# Patient Record
Sex: FEMALE | Race: AMERICAN INDIAN OR ALASKA NATIVE
[De-identification: names, ages, dates, MRNs, and addresses within clinical notes are randomized per-mention and may not be internally consistent; named-entity substitution may affect disease eponyms.]

---

## 2017-10-11 ENCOUNTER — HOSPITAL ENCOUNTER (OUTPATIENT)
Dept: HOSPITAL 5 - MAMMO | Age: 67
Discharge: HOME | End: 2017-10-11
Attending: PEDIATRICS
Payer: MEDICARE

## 2017-10-11 DIAGNOSIS — Z12.31: Primary | ICD-10-CM

## 2017-10-11 PROCEDURE — 77067 SCR MAMMO BI INCL CAD: CPT

## 2017-10-11 PROCEDURE — G0202 SCR MAMMO BI INCL CAD: HCPCS

## 2017-10-11 NOTE — MAMMOGRAPHY REPORT
BILATERAL MAMMOGRAM:



FINDINGS:

The breast tissue is heterogeneously dense, which could obscure

detection of small masses (approximately 50%-75% glandular).  No mass, 

distortion, suspicious calcification, or skin change is seen. There is 

no significant change when compared to prior study in October 2015.



CAD was utilized.



IMPRESSION:

Negative mammogram.  There is no mammographic evidence of

malignancy.



RECOMMENDATION:

Follow-up per ACS guidelines.



BI-RADS CATEGORY:  1 = Negative



ACR BI-RADS MAMMOGRAPHIC CODES:

0 = Needs additional imaging evaluation; 1 = Negative; 2 = Benign; 3 = 

Probably benign; 4 = Suspicious; 5 = Malignant; 6 = Known biopsy-proven 

malignancy



COMMENT:

      1.   Dense breast tissue, i.e., adenosis, fibrocystic 

            changes, etc., may obscure an underlying neoplasm.

      2.   Approximately 10% of cancers are not detected with

            mammography.

      3.   A negative mammography report should not delay biopsy 

            if a clinically suspicious mass is present.



COMMENT:

Patient follow-up letters are generated in [place].

## 2018-10-12 ENCOUNTER — HOSPITAL ENCOUNTER (OUTPATIENT)
Dept: HOSPITAL 5 - MAMMO | Age: 68
Discharge: HOME | End: 2018-10-12
Attending: PEDIATRICS
Payer: MEDICARE

## 2018-10-12 DIAGNOSIS — Z12.31: Primary | ICD-10-CM

## 2018-10-12 PROCEDURE — 77067 SCR MAMMO BI INCL CAD: CPT

## 2018-10-15 NOTE — MAMMOGRAPHY REPORT
BILATERAL DIGITAL SCREENING MAMMOGRAM with CAD: 10/12/18 08:21:00



CLINICAL: Routine screening.



COMPARISON:10/11/17 and 10/08/15



FINDINGS: The breasts are heterogeneously dense, which may obscure 

small masses.Stable asymmetric disc shaped left subareolar asymmetric 

density.  No mass, architectural distortion or suspicious 

calcifications.



IMPRESSION: No mammographic evidence of malignancy.  Stable left benign 

subareolar thickening.





BI-RADS CATEGORY: 2 - - Benign



RECOMMENDATION: Routine mammographic screening in one year.





COMMENT:

Patient follow-up letters are generated by our [employer] application.

## 2019-10-15 ENCOUNTER — HOSPITAL ENCOUNTER (OUTPATIENT)
Dept: HOSPITAL 5 - MAMMO | Age: 69
Discharge: HOME | End: 2019-10-15
Attending: PEDIATRICS
Payer: MEDICARE

## 2019-10-15 DIAGNOSIS — Z12.31: ICD-10-CM

## 2019-10-15 DIAGNOSIS — Z13.820: Primary | ICD-10-CM

## 2019-10-15 DIAGNOSIS — Z78.0: ICD-10-CM

## 2019-10-15 PROCEDURE — 77080 DXA BONE DENSITY AXIAL: CPT

## 2019-10-15 PROCEDURE — 77067 SCR MAMMO BI INCL CAD: CPT

## 2019-10-16 NOTE — MAMMOGRAPHY REPORT
DIGITAL SCREENING MAMMOGRAM WITH CAD, 10/15/2019



INDICATION: Routine screening mammography. 



TECHNIQUE:  Digital bilateral  2D mammography was obtained in the craniocaudal and mediolateral obliq
ue projections. This examination was interpreted with the benefit of Computer-Aided Detection analysi
s.



COMPARISON: 10/12/2018 and 10/11/2017



FINDINGS: 



Breast Density: The breasts are heterogeneously dense, which may obscure small masses.



There is no evidence of dominant mass, suspicious calcifications or architectural distortion in eithe
r breast.



IMPRESSION: No mammographic evidence of malignancy.



Follow up recommendation: Routine yearly



BI-RADS Category 1:  Negative.



A "normal" or negative report should not discourage follow up or biopsy of a clinically significant f
inding.



A written summary of these findings will be mailed to the patient. The patient will be entered into a
 mammography reporting system which will generate a reminder letter for the patient's next appointmen
t at the appropriate interval.



The American College of Radiology recommends yearly mammograms starting at age 40 and continuing as l
ha as a woman is in good health.  Breast MRI is recommended for women with an approximate 20-25% or 
greater lifetime risk of breast cancer, including women with a strong family history of breast or ova
lorena cancer or who have been treated for Hodgkin's disease.



Signer Name: Ran Llanos MD 

Signed: 10/16/2019 4:27 PM

 Workstation Name: [id number]

## 2019-10-17 NOTE — MAMMOGRAPHY REPORT
BONE DEXA



CLINICAL: Postmenopausal.



COMPARISON: 10/8/2015 and 10/11/2013



TECHNIQUE: 2 site bone DEXA performed on an Hologic scanner.



FINDINGS:



The average BMD of the lumbar spine L1-L4 is 1.020g/cm squared with a T score of -0.2 and a Z score o
f +0.9. This compares to 1.025g/cm squared on the last exam and represents a -0.5 % change from the [
last study and a +0.5% change from baseline].



The average BMD of the right hip is 0.714 g/cm squared with a T score of -1.9and a Z score of -1.0. T
his compares to 0.768 g/cm squared on the last exam and represents a -7.1 % change from the [last michael
dy and a -8.4% change from baseline].



IMPRESSION:

1. WHO classification: Normal with average fracture risk based on spine measurements.

2. WHO classification Osteopenia with increased fracture risk based on right hip measurements.

3. A modest decline in spine BMD and a moderate decline in right hip BMD compared to the last exam.

4. The FRAX 10 year fracture probability for a major osteoporotic fracture is 4.7%.

5. The FRAX 10 year fracture probability for hip fracture is 0.9%



RECOMMENDATION: Clinical correlation and routine screening.



Definitions:

BMD equal bone mineral density

T score = BMD related to peak bone mass of young adult ([place] expressed an standard deviation)

Z score = age-matched BMD expressed in SD

World health organization (WHO) diagnostic criteria

Normal T score greater than equal to 1 standard deviation

Osteopenia T score between -1 and -2.4 standard deviation

Osteoporosis T score -2.5 standard deviation or below.



Note: BMD is not the only risk factor for fracture; also consider factors such as the patient's age, 
risk of falling, previous osteoporotic fracture, family history of osteoporotic fractures, current sm
oker and low body weight.



Z scores are not calculated if greater than 80 years of age.



Signer Name: Ran Llanos MD 

Signed: 10/17/2019 8:44 AM

 Workstation Name: [id number]

## 2020-10-20 ENCOUNTER — HOSPITAL ENCOUNTER (OUTPATIENT)
Dept: HOSPITAL 5 - MAMMO | Age: 70
Discharge: HOME | End: 2020-10-20
Attending: PEDIATRICS
Payer: MEDICARE

## 2020-10-20 DIAGNOSIS — Z12.31: Primary | ICD-10-CM

## 2020-10-20 PROCEDURE — 77067 SCR MAMMO BI INCL CAD: CPT

## 2020-10-21 NOTE — MAMMOGRAPHY REPORT
BILATERAL DIGITAL SCREENING MAMMOGRAM WITH CAD 

 

HISTORY: SCREENING MAMMOGRAM



TECHNIQUE:  Routine digital mammographic imaging performed.  This examination was interpreted with stephania almaraz benefit of Computer-aided Detection analysis.



COMPARISON: 10/15/2019, 10/12/2018, 10/11/2017, 10/10/2016.



FINDINGS: 



Breast Density: heterogeneously dense breast parenchymal pattern which somewhat lessens the sensitivi
ty of the evaluation.



Digital CC and MLO views demonstrate no mammographic evidence of malignancy.





IMPRESSION:



No mammographic evidence of malignancy.  If the clinical examination remains stable, recommend bilate
ral mammogram in approximately one year.



BIRADS 1:  Negative.





FURTHER INFORMATION:  According to the American College of Radiology, yearly mammograms are recommend
ed starting at age 40 and continuing as long as a woman is in good health. Clinical Breast Exams shou
ld be part of a periodic health exam-about every 3 years for women in their 20s and 30s and every yea
r for women 40 and over. Breast self exam is an option for women starting in their 20s. Any breast ch
claudia noted on a breast self exam should be reported promptly to the patient's healthcare provider. Br
east MRI is recommended for women with an approximately 20-25% or greater lifetime risk of breast can
cer, including women with a strong family history of breast or ovarian cancer and women who have been
 treated for Hodgkin's disease.



A negative Mammography report should not discourage follow up or biopsy of a clinically significant f
inding and/or abnormality.



Dense breast tissue may obscure small neoplasms.  



The patient will be entered into a reminder system with a target due date for the next screening mamm
ogram.



Signer Name: Juan Pablo Morrison MD 

Signed: 10/21/2020 8:02 AM

Workstation Name: [id number]

## 2021-10-22 ENCOUNTER — HOSPITAL ENCOUNTER (OUTPATIENT)
Dept: HOSPITAL 5 - SPVWC | Age: 71
Discharge: HOME | End: 2021-10-22
Attending: PEDIATRICS
Payer: MEDICARE

## 2021-10-22 DIAGNOSIS — Z12.31: Primary | ICD-10-CM

## 2021-10-22 PROCEDURE — 77063 BREAST TOMOSYNTHESIS BI: CPT

## 2021-10-22 PROCEDURE — 77067 SCR MAMMO BI INCL CAD: CPT

## 2021-10-22 NOTE — MAMMOGRAPHY REPORT
DIGITAL SCREENING MAMMOGRAM WITH CAD, 10/22/2021



CLINICAL INFORMATION / INDICATION: Routine screening mammography. SCREENING MAMMO



TECHNIQUE:  Digital bilateral 2D mammography was obtained in the craniocaudal and mediolateral obliqu
e projections. This examination was interpreted with the benefit of Computer-Aided Detection analysis
.



COMPARISON: 10/20/2020



FINDINGS: 



Breast Density: The breasts are heterogeneously dense, which may obscure small masses.



No dominant mass, suspicious calcifications, or architectural distortion in either breast. 





 

IMPRESSION: No mammographic evidence of malignancy.



Follow up recommendation: Routine yearly



BI-RADS Category 1:  Negative.





-------------------------------------------------------------------------------------------

A "normal" or negative report should not discourage follow up or biopsy of a clinically significant f
inding.



A written summary of these findings will be mailed to the patient. The patient will be entered into a
 mammography reporting system which will generate a reminder letter for the patient's next appointmen
t at the appropriate interval.



The American College of Radiology recommends yearly mammograms starting at age 40 and continuing as l
ha as a woman is in good health.  Breast MRI is recommended for women with an approximate 20-25% or 
greater lifetime risk of breast cancer, including women with a strong family history of breast or ova
lorena cancer or who have been treated for Hodgkin's disease.



Signer Name: Byron Guerrero MD 

Signed: 10/22/2021 1:12 PM

Workstation Name: [id number]

## 2021-10-22 NOTE — MAMMOGRAPHY REPORT
DIGITAL SCREENING MAMMOGRAM WITH CAD, 10/22/2021



CLINICAL INFORMATION / INDICATION: Routine screening mammography. SCREENING MAMMO



TECHNIQUE:  Digital bilateral 2D mammography was obtained in the craniocaudal and mediolateral obliqu
e projections. This examination was interpreted with the benefit of Computer-Aided Detection analysis
.



COMPARISON: 10/20/2020



FINDINGS: 



Breast Density: The breasts are heterogeneously dense, which may obscure small masses.



No dominant mass, suspicious calcifications, or architectural distortion in either breast. 





 

IMPRESSION: No mammographic evidence of malignancy.



Follow up recommendation: Routine yearly



BI-RADS Category 1:  Negative.





-------------------------------------------------------------------------------------------

A "normal" or negative report should not discourage follow up or biopsy of a clinically significant f
inding.



A written summary of these findings will be mailed to the patient. The patient will be entered into a
 mammography reporting system which will generate a reminder letter for the patient's next appointmen
t at the appropriate interval.



The American [employer] recommends yearly mammograms starting at age 40 and continuing as l
ha as a woman is in good health.  Breast MRI is recommended for women with an approximate 20-25% or 
greater lifetime risk of breast cancer, including women with a strong family history of breast or ova
lorena cancer or who have been treated for Hodgkin's disease.



Signer Name: Byron Guerrero MD 

Signed: 10/22/2021 1:12 PM

Workstation Name: [id number]